# Patient Record
Sex: MALE | Race: BLACK OR AFRICAN AMERICAN | Employment: OTHER | URBAN - METROPOLITAN AREA
[De-identification: names, ages, dates, MRNs, and addresses within clinical notes are randomized per-mention and may not be internally consistent; named-entity substitution may affect disease eponyms.]

---

## 2018-06-24 ENCOUNTER — HOSPITAL ENCOUNTER (EMERGENCY)
Age: 83
Discharge: HOME | End: 2018-06-24
Attending: EMERGENCY MEDICINE
Payer: MEDICARE

## 2018-06-24 ENCOUNTER — APPOINTMENT (OUTPATIENT)
Dept: GENERAL RADIOLOGY | Age: 83
End: 2018-06-24
Attending: PHYSICIAN ASSISTANT
Payer: MEDICARE

## 2018-06-24 VITALS
OXYGEN SATURATION: 97 % | RESPIRATION RATE: 20 BRPM | BODY MASS INDEX: 27.3 KG/M2 | TEMPERATURE: 99.4 F | DIASTOLIC BLOOD PRESSURE: 58 MMHG | WEIGHT: 195 LBS | HEIGHT: 71 IN | SYSTOLIC BLOOD PRESSURE: 138 MMHG | HEART RATE: 90 BPM

## 2018-06-24 DIAGNOSIS — J06.9 ACUTE UPPER RESPIRATORY INFECTION: ICD-10-CM

## 2018-06-24 DIAGNOSIS — J30.2 SEASONAL ALLERGIC RHINITIS, UNSPECIFIED TRIGGER: Primary | ICD-10-CM

## 2018-06-24 PROCEDURE — 71046 X-RAY EXAM CHEST 2 VIEWS: CPT

## 2018-06-24 PROCEDURE — 99282 EMERGENCY DEPT VISIT SF MDM: CPT

## 2018-06-24 RX ORDER — BENZONATATE 100 MG/1
100 CAPSULE ORAL
Qty: 10 CAP | Refills: 0 | Status: SHIPPED | OUTPATIENT
Start: 2018-06-24

## 2018-06-24 NOTE — ED PROVIDER NOTES
EMERGENCY DEPARTMENT HISTORY AND PHYSICAL EXAM    Date: 6/24/2018  Patient Name: Estefany Knox.    History of Presenting Illness     No chief complaint on file. History Provided By: Patient and Patient's Wife    Chief Complaint: Nasal congestion, sore throat, cough  Duration: Yesterday  Timing:  Gradual  Location: Chest  Quality: N/A  Severity: Mild  Modifying Factors: None identified. Associated Symptoms: subjective fever    Additional History (Context):   11:10 AM  Shruthi Fernandez Sr. is a 81 y.o. male with PMHX of HTN, HDL, BPH, asbestosis, COPD, DM seasonal allergies who takes allegra and flonase presents to the emergency department C/O 1 day of nasal congestion, sore throat with associated hoarse voice and productive cough at night only. Also reporting subjective fevers, took his normal 81mg ASA this morning. Pt attributes his current symptoms to part of his ceiling falling in d/t mold. He states symptoms feel like prior episodes of seasonal allergies. Pt denies CP, SOB, wheezing ill contacts, and any other sxs or complaints. PCP: Megan Zamudio MD    Current Outpatient Prescriptions   Medication Sig Dispense Refill    benzonatate (TESSALON PERLES) 100 mg capsule Take 1 Cap by mouth two (2) times daily as needed for Cough for up to 10 doses. 10 Cap 0    finasteride (PROSCAR) 5 mg tablet   2    augmented betamethasone dipropionate (DIPROLENE AF) 0.05 % topical cream Apply  to affected area two (2) times a day.  folic acid (FOLVITE) 1 mg tablet Take  by mouth daily.  donepezil (ARICEPT) 5 mg tablet Take  by mouth nightly.  fluticasone (FLOVENT HFA) 220 mcg/actuation inhaler Take  by inhalation.  lisinopril (PRINIVIL, ZESTRIL) 20 mg tablet Take  by mouth daily.  canagliflozin (INVOKANA) 100 mg tablet Take  by mouth Daily (before breakfast).  atorvastatin (LIPITOR) 20 mg tablet Take  by mouth daily.       Cholecalciferol, Vitamin D3, 50,000 unit cap Take  by mouth.      metoprolol (LOPRESSOR) 50 mg tablet Take  by mouth two (2) times a day.  glipizide (GLUCOTROL) 10 mg tablet Take 10 mg by mouth two (2) times a day.  fluticasone (FLONASE) 50 mcg/actuation nasal spray nightly.  fexofenadine (ALLEGRA) 180 mg tablet Take  by mouth daily.  aspirin (ASPIRIN) 325 mg tablet Take 325 mg by mouth daily.  ONETOUCH ULTRA TEST strip   3    tadalafil (CIALIS) 20 mg tablet Take 1 Tab by mouth as needed. 2 Tab 0    albuterol (PROVENTIL HFA, VENTOLIN HFA, PROAIR HFA) 90 mcg/actuation inhaler Take  by inhalation.  tamsulosin (FLOMAX) 0.4 mg capsule Take 0.4 mg by mouth daily.  sitaGLIPtin-metFORMIN (JANUMET) 50-1,000 mg per tablet Take 1 tablet by mouth two (2) times daily (with meals).  sildenafil citrate (VIAGRA) 100 mg tablet Take 100 mg by mouth as needed. Past History     Past Medical History:  Past Medical History:   Diagnosis Date    AR (allergic rhinitis)     [facility]501) [facility]     Bladder neck contracture     BPH (benign prostatic hyperplasia)     CAD (coronary artery disease)     Cardiac disease     COPD (chronic obstructive pulmonary disease) ([facility])     Diabetes mellitus with renal complications ([facility])     Diverticulosis of colon     Erectile dysfunction     HTN (hypertension)     Hyperlipidemia     Microalbuminuria     Nephritis and nephropathy     Vitamin D deficiency        Past Surgical History:  Past Surgical History:   Procedure Laterality Date    HX CORONARY ARTERY BYPASS GRAFT  5/2003    HX ENDOSCOPY  5/2006    colon, diagnostic       Family History:  No family history on file. Social History:  Social History   Substance Use Topics    Smoking status: Former Smoker     Types: Cigarettes    Smokeless tobacco: Never Used    Alcohol use No       Allergies:   Allergies   Allergen Reactions    Honey Hives    Pollen Extracts Other (comments)     Nasal Symptoms         Review of Systems   Review of Systems   Constitutional: Positive for fever. HENT: Positive for congestion and sore throat. Respiratory: Positive for cough. Negative for chest tightness, shortness of breath and wheezing. Cardiovascular: Negative for chest pain. Gastrointestinal: Negative for diarrhea and vomiting. Genitourinary: Negative for dysuria. All other systems reviewed and are negative. Physical Exam     Vitals:    06/24/18 1103   BP: 138/58   Pulse: 90   Resp: 20   Temp: 99.4 °F (37.4 °C)   SpO2: 97%   Weight: 88.5 kg (195 lb)   Height: 5' 10.5\" (1.791 m)     Physical Exam   Constitutional: He appears well-developed and well-nourished. No distress. HENT:   Head: Normocephalic and atraumatic. Right Ear: External ear normal.   Left Ear: External ear normal.   Nose: Nose normal.   Mouth/Throat: Oropharynx is clear and moist. No oropharyngeal exudate. Eyes: Conjunctivae are normal.   Neck: Normal range of motion. Cardiovascular: Normal rate and regular rhythm. Pulmonary/Chest: Effort normal. No respiratory distress. He has no wheezes. Lung sounds diminished on the left. Abdominal: Soft. There is no tenderness. Lymphadenopathy:     He has no cervical adenopathy. Neurological: He is alert. Ambulates without assistance. Skin: Skin is warm and dry. He is not diaphoretic. Psychiatric: He has a normal mood and affect. Vitals reviewed. Diagnostic Study Results     Labs -   No results found for this or any previous visit (from the past 12 hour(s)). Radiologic Studies -  CXR negative for acute process pending official radiology read. XR CHEST PA LAT    (Results Pending)     CT Results  (Last 48 hours)    None        CXR Results  (Last 48 hours)    None          Medications given in the ED-  Medications - No data to display      Medical Decision Making   I am the first provider for this patient.     I reviewed the vital signs, available nursing notes, past medical history, past surgical history, family history and social history. Vital Signs-Reviewed the patient's vital signs. Records Reviewed: Nursing Notes    Provider Notes (Medical Decision Making): Alert, well-appearing, non-toxic male presenting with wife for evaluation of 1 day of nasal congestion, sore throat, subjective fevers and cough worse at night. CXR negative for acute process pending official radiology read. Afebrile here, does not appear to be toxic, ambulates without difficulty or signs of respiratory distress. Will advise he continue flonase, allegra, add on tessalon perles for cough for likely allergic rhinitis/ viral URI symptoms. Based on today's assessment, I feel the patient is stable for discharge to home with outpatient follow up. Return precautions and referrals provided. Procedures:  Procedures      Diagnosis and Disposition       DISCHARGE NOTE:  11:32 AM  Erika Mayer Sr.'s  results have been reviewed with him. He has been counseled regarding his diagnosis, treatment, and plan. He verbally conveys understanding and agreement of the signs, symptoms, diagnosis, treatment and prognosis and additionally agrees to follow up as discussed. He also agrees with the care-plan and conveys that all of his questions have been answered. I have also provided discharge instructions for him that include: educational information regarding their diagnosis and treatment, and list of reasons why they would want to return to the ED prior to their follow-up appointment, should his condition change. He has been provided with education for proper emergency department utilization. CLINICAL IMPRESSION:    1. Seasonal allergic rhinitis, unspecified trigger    2. Acute upper respiratory infection        PLAN:  1. D/C Home  2.    Current Discharge Medication List      START taking these medications    Details   benzonatate (TESSALON PERLES) 100 mg capsule Take 1 Cap by mouth two (2) times daily as needed for Cough for up to 10 doses.  Qty: 10 Cap, Refills: 0           3.    Follow-up Information     Follow up With Details Comments Contact Info    Demarcus Dumont MD Schedule an appointment as soon as possible for a visit  [facility]  [address] [address]      [address] [facility] EMERGENCY DEPT  As needed, If symptoms worsen [address] [facility]  [phone]

## 2018-06-24 NOTE — DISCHARGE INSTRUCTIONS
Upper Respiratory Infection (Cold): Care Instructions  Your Care Instructions    An upper respiratory infection, or URI, is an infection of the nose, sinuses, or throat. URIs are spread by coughs, sneezes, and direct contact. The common cold is the most frequent kind of URI. The flu and sinus infections are other kinds of URIs. Almost all URIs are caused by viruses. Antibiotics won't cure them. But you can treat most infections with home care. This may include drinking lots of fluids and taking over-the-counter pain medicine. You will probably feel better in 4 to 10 days. The doctor has checked you carefully, but problems can develop later. If you notice any problems or new symptoms, get medical treatment right away. Follow-up care is a key part of your treatment and safety. Be sure to make and go to all appointments, and call your doctor if you are having problems. It's also a good idea to know your test results and keep a list of the medicines you take. How can you care for yourself at home? · To prevent dehydration, drink plenty of fluids, enough so that your urine is light yellow or clear like water. Choose water and other caffeine-free clear liquids until you feel better. If you have kidney, heart, or liver disease and have to limit fluids, talk with your doctor before you increase the amount of fluids you drink. · Take an over-the-counter pain medicine, such as acetaminophen (Tylenol), ibuprofen (Advil, Motrin), or naproxen (Aleve). Read and follow all instructions on the label. · Before you use cough and cold medicines, check the label. These medicines may not be safe for young children or for people with certain health problems. · Be careful when taking over-the-counter cold or flu medicines and Tylenol at the same time. Many of these medicines have acetaminophen, which is Tylenol. Read the labels to make sure that you are not taking more than the recommended dose.  Too much acetaminophen (Tylenol) can be harmful. · Get plenty of rest.  · Do not smoke or allow others to smoke around you. If you need help quitting, talk to your doctor about stop-smoking programs and medicines. These can increase your chances of quitting for good. When should you call for help? Call 911 anytime you think you may need emergency care. For example, call if:  ? · You have severe trouble breathing. ?Call your doctor now or seek immediate medical care if:  ? · You seem to be getting much sicker. ? · You have new or worse trouble breathing. ? · You have a new or higher fever. ? · You have a new rash. ? Watch closely for changes in your health, and be sure to contact your doctor if:  ? · You have a new symptom, such as a sore throat, an earache, or sinus pain. ? · You cough more deeply or more often, especially if you notice more mucus or a change in the color of your mucus. ? · You do not get better as expected. Where can you learn more? Go to http://galina-eloina.info/. Enter J254 in the search box to learn more about \"Upper Respiratory Infection (Cold): Care Instructions. \"  Current as of: May 12, 2017  Content Version: 11.4  © 4451-8433 [employer]. Care instructions adapted under license by [employer] (which disclaims liability or warranty for this information). If you have questions about a medical condition or this instruction, always ask your healthcare professional. [address] [place] [address] any warranty or liability for your use of this information. Allergies: Care Instructions  Your Care Instructions    Allergies occur when your body's defense system (immune system) overreacts to certain substances. The immune system treats a harmless substance as if it were a harmful germ or virus. Many things can cause this overreaction, including pollens, medicine, food, dust, animal dander, and mold. Allergies can be mild or severe.  Mild allergies can be managed with home treatment. But medicine may be needed to prevent problems. Managing your allergies is an important part of staying healthy. Your doctor may suggest that you have allergy testing to help find out what is causing your allergies. When you know what things trigger your symptoms, you can avoid them. This can prevent allergy symptoms and other health problems. For severe allergies that cause reactions that affect your whole body (anaphylactic reactions), your doctor may prescribe a shot of epinephrine to carry with you in case you have a severe reaction. Learn how to give yourself the shot and keep it with you at all times. Make sure it is not . Follow-up care is a key part of your treatment and safety. Be sure to make and go to all appointments, and call your doctor if you are having problems. It's also a good idea to know your test results and keep a list of the medicines you take. How can you care for yourself at home? · If you have been told by your doctor that dust or dust mites are causing your allergy, decrease the dust around your bed:  [facility] sheets, pillowcases, and other bedding in hot water every week. ¨ Use dust-proof covers for pillows, duvets, and mattresses. Avoid plastic covers because they tear easily and do not \"breathe. \" Wash as instructed on the label. ¨ Do not use any blankets and pillows that you do not need. ¨ Use blankets that you can wash in your washing machine. ¨ Consider removing drapes and carpets, which attract and hold dust, from your bedroom. · If you are allergic to house dust and mites, do not use home humidifiers. Your doctor can suggest ways you can control dust and mites. · Look for signs of cockroaches. Cockroaches cause allergic reactions. Use cockroach baits to get rid of them. Then, clean your home well. Cockroaches like areas where grocery bags, newspapers, empty bottles, or cardboard boxes are stored.  Do not keep these inside your home, and keep trash and food containers sealed. Seal off any spots where cockroaches might enter your home. · If you are allergic to mold, get rid of furniture, rugs, and drapes that smell musty. Check for mold in the bathroom. · If you are allergic to outdoor pollen or mold spores, use air-conditioning. Change or clean all filters every month. Keep windows closed. · If you are allergic to pollen, stay inside when pollen counts are high. Use a vacuum  with a HEPA filter or a double-thickness filter at least two times each week. · Stay inside when air pollution is bad. Avoid paint fumes, perfumes, and other strong odors. · Avoid conditions that make your allergies worse. Stay away from smoke. Do not smoke or let anyone else smoke in your house. Do not use fireplaces or wood-burning stoves. · If you are allergic to your pets, change the air filter in your furnace every month. Use high-efficiency filters. · If you are allergic to pet dander, keep pets outside or out of your bedroom. Old carpet and cloth furniture can hold a lot of animal dander. You may need to replace them. When should you call for help? Give an epinephrine shot if:  ? · You think you are having a severe allergic reaction. ? · You have symptoms in more than one body area, such as mild nausea and an itchy mouth. ? After giving an epinephrine shot call 911, even if you feel better. ?Call 911 if:  ? · You have symptoms of a severe allergic reaction. These may include:  ¨ Sudden raised, red areas (hives) all over your body. ¨ Swelling of the throat, mouth, lips, or tongue. ¨ Trouble breathing. ¨ Passing out (losing consciousness). Or you may feel very lightheaded or suddenly feel weak, confused, or restless. ? · You have been given an epinephrine shot, even if you feel better.    ?Call your doctor now or seek immediate medical care if:  ? · You have symptoms of an allergic reaction, such as:  ¨ A rash or hives (raised, red areas on the skin). ¨ Itching. ¨ Swelling. ¨ Belly pain, nausea, or vomiting. ? Watch closely for changes in your health, and be sure to contact your doctor if:  ? · You do not get better as expected. Where can you learn more? Go to http://galina-eloina.info/. Enter O447 in the search box to learn more about \"Allergies: Care Instructions. \"  Current as of: September 29, 2016  Content Version: 11.4  © 5991-3500 [employer]. Care instructions adapted under license by [employer] (which disclaims liability or warranty for this information). If you have questions about a medical condition or this instruction, always ask your healthcare professional. [address] any warranty or liability for your use of this information.